# Patient Record
Sex: FEMALE | Race: OTHER | Employment: STUDENT | URBAN - METROPOLITAN AREA
[De-identification: names, ages, dates, MRNs, and addresses within clinical notes are randomized per-mention and may not be internally consistent; named-entity substitution may affect disease eponyms.]

---
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
## 2021-04-15 ENCOUNTER — OFFICE VISIT (OUTPATIENT)
Dept: FAMILY MEDICINE CLINIC | Age: 21
End: 2021-04-15
Payer: COMMERCIAL

## 2021-04-15 VITALS
SYSTOLIC BLOOD PRESSURE: 128 MMHG | WEIGHT: 144 LBS | HEART RATE: 93 BPM | TEMPERATURE: 98 F | HEIGHT: 64 IN | BODY MASS INDEX: 24.59 KG/M2 | RESPIRATION RATE: 16 BRPM | OXYGEN SATURATION: 99 % | DIASTOLIC BLOOD PRESSURE: 88 MMHG

## 2021-04-15 DIAGNOSIS — Z86.19 HISTORY OF MONONUCLEOSIS: ICD-10-CM

## 2021-04-15 DIAGNOSIS — Z00.00 WELL ADULT EXAM: Primary | ICD-10-CM

## 2021-04-15 LAB
ALBUMIN SERPL-MCNC: 4.2 G/DL (ref 3.5–5.2)
ALP BLD-CCNC: 93 U/L (ref 35–104)
ALT SERPL-CCNC: 30 U/L (ref 0–32)
ANION GAP SERPL CALCULATED.3IONS-SCNC: 12 MMOL/L (ref 7–16)
AST SERPL-CCNC: 23 U/L (ref 0–31)
BASOPHILS ABSOLUTE: 0.03 E9/L (ref 0–0.2)
BASOPHILS RELATIVE PERCENT: 0.5 % (ref 0–2)
BILIRUB SERPL-MCNC: 0.7 MG/DL (ref 0–1.2)
BUN BLDV-MCNC: 8 MG/DL (ref 6–20)
CALCIUM SERPL-MCNC: 9.4 MG/DL (ref 8.6–10.2)
CHLORIDE BLD-SCNC: 105 MMOL/L (ref 98–107)
CO2: 23 MMOL/L (ref 22–29)
CREAT SERPL-MCNC: 0.9 MG/DL (ref 0.5–1)
EOSINOPHILS ABSOLUTE: 0.22 E9/L (ref 0.05–0.5)
EOSINOPHILS RELATIVE PERCENT: 3.8 % (ref 0–6)
GFR AFRICAN AMERICAN: >60
GFR NON-AFRICAN AMERICAN: >60 ML/MIN/1.73
GLUCOSE BLD-MCNC: 86 MG/DL (ref 74–99)
HCT VFR BLD CALC: 41.6 % (ref 34–48)
HEMOGLOBIN: 13.1 G/DL (ref 11.5–15.5)
IMMATURE GRANULOCYTES #: 0.01 E9/L
IMMATURE GRANULOCYTES %: 0.2 % (ref 0–5)
LYMPHOCYTES ABSOLUTE: 2.7 E9/L (ref 1.5–4)
LYMPHOCYTES RELATIVE PERCENT: 47 % (ref 20–42)
MCH RBC QN AUTO: 30.3 PG (ref 26–35)
MCHC RBC AUTO-ENTMCNC: 31.5 % (ref 32–34.5)
MCV RBC AUTO: 96.1 FL (ref 80–99.9)
MONOCYTES ABSOLUTE: 0.47 E9/L (ref 0.1–0.95)
MONOCYTES RELATIVE PERCENT: 8.2 % (ref 2–12)
NEUTROPHILS ABSOLUTE: 2.32 E9/L (ref 1.8–7.3)
NEUTROPHILS RELATIVE PERCENT: 40.3 % (ref 43–80)
PDW BLD-RTO: 11.9 FL (ref 11.5–15)
PLATELET # BLD: 262 E9/L (ref 130–450)
PMV BLD AUTO: 10.5 FL (ref 7–12)
POTASSIUM SERPL-SCNC: 4.6 MMOL/L (ref 3.5–5)
RBC # BLD: 4.33 E12/L (ref 3.5–5.5)
SODIUM BLD-SCNC: 140 MMOL/L (ref 132–146)
TOTAL PROTEIN: 7.1 G/DL (ref 6.4–8.3)
WBC # BLD: 5.8 E9/L (ref 4.5–11.5)

## 2021-04-15 PROCEDURE — 99395 PREV VISIT EST AGE 18-39: CPT | Performed by: FAMILY MEDICINE

## 2021-04-15 PROCEDURE — 36415 COLL VENOUS BLD VENIPUNCTURE: CPT | Performed by: FAMILY MEDICINE

## 2021-04-15 ASSESSMENT — ENCOUNTER SYMPTOMS
WHEEZING: 0
COLOR CHANGE: 0
SORE THROAT: 0
CONSTIPATION: 0
VOMITING: 0
ABDOMINAL PAIN: 0
NAUSEA: 0
RHINORRHEA: 0
BLOOD IN STOOL: 0
COUGH: 0
SHORTNESS OF BREATH: 0
EYE PAIN: 0
DIARRHEA: 0

## 2021-04-15 ASSESSMENT — PATIENT HEALTH QUESTIONNAIRE - PHQ9
SUM OF ALL RESPONSES TO PHQ QUESTIONS 1-9: 0
1. LITTLE INTEREST OR PLEASURE IN DOING THINGS: 0

## 2021-04-15 NOTE — PATIENT INSTRUCTIONS
[facility] ([facility]) -- [place] [address] [place] [address]      [phone]

## 2021-04-15 NOTE — PROGRESS NOTES
Subjective:    Abel Mix is here for a well visit. She has had both EB and CMV infection and is now doing better. ROS:  Otherwise negative    Patient Active Problem List   Diagnosis    ABC (aneurysmal bone cyst)    Spondylolysis, lumbar region    Elevated liver enzymes    EBV infection    Cytomegaloviral mononucleosis without complication    Low folate       Past medical, surgical, family and social history were reviewed, non-contributory, and unchanged unless otherwise stated. Objective:    /88   Pulse 93   Temp 98 °F (36.7 °C) (Temporal)   Resp 16   Ht 5' 4\" (1.626 m)   Wt 144 lb (65.3 kg)   SpO2 99%   BMI 24.72 kg/m²     Exam is as noted by resident with the following changes, additions or corrections:    General:  NAD; alert & oriented x 3   HEENT: Normocephalic without masses, TM's clear, OP is WNL, neck supple without masses, no cervical adenopathy, no bruits. Heart:  RRR, no murmurs, gallops, or rubs. Lungs:  CTA bilaterally, no wheeze, rales or rhonchi  Abd: bowel sounds present, nontender, nondistended, no masses  Extrem:  No clubbing, cyanosis, or edema  Neuro:  Oriented x3, no gross motor or sensory deficit noted. Assessment/Plan:          Abel Mix was seen today for annual exam.    Diagnoses and all orders for this visit:    Well adult exam    History of mononucleosis  -     COMPREHENSIVE METABOLIC PANEL; Future  -     CBC WITH AUTO DIFFERENTIAL; Future              Attending Physician Statement    I have reviewed the chart, including any radiology or labs, and have seen the patient with the resident(s). I personally reviewed and performed key elements of the history and exam.  I agree with the assessment, plan and orders as documented by the resident. Please refer to the resident note for additional information.

## 2021-04-15 NOTE — PROGRESS NOTES
2001, 2001, 07/02/2002, 04/04/2005    DTaP (Infanrix) 2001, 2001, 2001, 07/02/2002, 04/04/2005    HIB PRP-T (ActHIB, Hiberix) 2001, 2001, 04/02/2002    HPV Quadrivalent (Gardasil) 12/08/2015, 02/09/2016, 06/14/2016    Hep B/Hib (Comvax) 2001, 2001, 04/02/2002    Hepatitis A Ped/Adol (Havrix, Vaqta) 05/14/2007, 12/16/2008    Hepatitis B Ped/Adol (Engerix-B, Recombivax HB) 2001, 2001, 04/02/2002    Influenza Nasal 09/29/2008, 12/16/2008, 11/15/2011    Influenza Virus Vaccine 10/16/2012, 11/25/2013, 12/02/2014, 12/08/2015, 12/18/2017    MMR 04/02/2002, 04/04/2005    Meningococcal MCV4P (Menactra) 11/19/2012, 12/18/2017    Pneumococcal Conjugate 7-valent (Pedro Delude) 2001, 2001, 2001, 10/01/2002    Polio IPV (IPOL) 2001, 2001, 07/02/2002, 04/04/2005    Tdap (Boostrix, Adacel) 11/19/2012    Varicella (Varivax) 04/02/2002, 05/14/2007       Review of Systems   Constitutional: Negative for appetite change, chills, fever and unexpected weight change. HENT: Negative for congestion, ear pain, hearing loss, rhinorrhea, sore throat and tinnitus. Eyes: Negative for pain and visual disturbance. Respiratory: Negative for cough, shortness of breath and wheezing. Cardiovascular: Negative for chest pain and palpitations. Gastrointestinal: Negative for abdominal pain, blood in stool, constipation, diarrhea, nausea and vomiting. Genitourinary: Negative for dysuria, frequency, hematuria and urgency. Skin: Negative for color change and rash. Allergic/Immunologic: Positive for environmental allergies. Negative for food allergies. Neurological: Negative for dizziness, syncope and light-headedness. Hematological: Negative for adenopathy. Does not bruise/bleed easily. Psychiatric/Behavioral: Negative for dysphoric mood. The patient is not nervous/anxious.         BP Readings from Last 3 Encounters:   04/15/21 128/88   12/14/20 117/74   09/14/20 129/74       VS:  /88   Pulse 93   Temp 98 °F (36.7 °C) (Temporal)   Resp 16   Ht 5' 4\" (1.626 m)   Wt 144 lb (65.3 kg)   SpO2 99%   BMI 24.72 kg/m²     Physical Exam  Vitals signs reviewed. Constitutional:       General: She is awake. She is not in acute distress. Appearance: She is well-developed and well-groomed. She is not ill-appearing, toxic-appearing or diaphoretic. HENT:      Head: Normocephalic and atraumatic. Right Ear: Ear canal normal. Tympanic membrane is not erythematous or bulging. Left Ear: Ear canal normal. Tympanic membrane is not erythematous or bulging. Nose:      Right Nostril: No occlusion. Left Nostril: No occlusion. Right Turbinates: Swollen. Left Turbinates: Swollen. Mouth/Throat:      Lips: Pink. Mouth: Mucous membranes are moist.      Pharynx: Uvula midline. No oropharyngeal exudate or posterior oropharyngeal erythema. Neck:      Musculoskeletal: Neck supple. Cardiovascular:      Rate and Rhythm: Normal rate and regular rhythm. Heart sounds: S1 normal and S2 normal.   Pulmonary:      Breath sounds: No decreased breath sounds, wheezing, rhonchi or rales. Abdominal:      General: Abdomen is flat. Bowel sounds are normal.      Palpations: Abdomen is soft. There is no hepatomegaly. Tenderness: There is no abdominal tenderness. There is no guarding or rebound. Musculoskeletal:      Right lower leg: She exhibits no deformity. No edema. Left lower leg: She exhibits no deformity. No edema. Lymphadenopathy:      Cervical: No cervical adenopathy. Neurological:      General: No focal deficit present. Mental Status: She is alert. Psychiatric:         Attention and Perception: Attention normal.         Mood and Affect: Mood normal.         Speech: Speech normal.         Behavior: Behavior is cooperative. Thought Content:  Thought content normal.         Cognition and Memory: Cognition normal.         Judgment: Judgment normal.       Assessment/Plan:  1. Well adult exam  Follow-up in 1 year     2. History of mononucleosis  Recheck labs for normalization as patient had elevated liver enzymes when she had EBV and CMV last summer.   - COMPREHENSIVE METABOLIC PANEL; Future  - CBC WITH AUTO DIFFERENTIAL;  Future      Return in about 1 year (around 4/15/2022) for well-adult exam .    Chapincito Anyaa DO, PGY-3

## 2021-04-19 ENCOUNTER — OFFICE VISIT (OUTPATIENT)
Dept: FAMILY MEDICINE CLINIC | Age: 21
End: 2021-04-19

## 2021-04-19 VITALS
TEMPERATURE: 97.8 F | SYSTOLIC BLOOD PRESSURE: 118 MMHG | HEIGHT: 64 IN | HEART RATE: 72 BPM | DIASTOLIC BLOOD PRESSURE: 74 MMHG | RESPIRATION RATE: 18 BRPM | OXYGEN SATURATION: 98 % | BODY MASS INDEX: 24.41 KG/M2 | WEIGHT: 143 LBS

## 2021-04-19 DIAGNOSIS — I49.8 BIGEMINAL RHYTHM: ICD-10-CM

## 2021-04-19 DIAGNOSIS — R07.89 LEFT-SIDED CHEST WALL PAIN: Primary | ICD-10-CM

## 2021-04-19 PROCEDURE — 99214 OFFICE O/P EST MOD 30 MIN: CPT | Performed by: PHYSICIAN ASSISTANT

## 2021-04-19 PROCEDURE — 93000 ELECTROCARDIOGRAM COMPLETE: CPT | Performed by: PHYSICIAN ASSISTANT

## 2021-04-19 PROCEDURE — 3006F CXR DOC REV: CPT | Performed by: PHYSICIAN ASSISTANT

## 2021-04-19 NOTE — PROGRESS NOTES
Current Outpatient Medications:     norethindrone-ethinyl estradiol-Fe (LO LOESTRIN FE) 1 MG-10 MCG / 10 MCG tablet, Take 1 tablet by mouth daily, Disp: 84 tablet, Rfl: 3    cetirizine (ZYRTEC) 10 MG tablet, Take 1 tablet by mouth daily, Disp: 30 tablet, Rfl: 1    Allergies:   No Known Allergies    Social History:     Social History     Tobacco Use    Smoking status: Never Smoker    Smokeless tobacco: Never Used   Substance Use Topics    Alcohol use: Yes     Frequency: Monthly or less    Drug use: Never       Patient lives at home. Physical Exam:     Vitals:    04/19/21 1650   BP: 118/74   Pulse: 72   Resp: 18   Temp: 97.8 °F (36.6 °C)   SpO2: 98%   Weight: 143 lb (64.9 kg)   Height: 5' 4\" (1.626 m)       Exam:  Physical Exam  Nurses note and vital signs reviewed and patient is not hypoxic. General: The patient appears well and in no apparent distress. Patient is resting comfortably on cart. Skin: Warm, dry, no pallor noted. There is no rash noted. Head: Normocephalic, atraumatic. Eye: Normal conjunctiva  Ears, Nose, Mouth, and Throat: Oral mucosa is moist  Cardiovascular: Regular Rate and Rhythm  Respiratory: Patient is in no distress, no accessory muscle use, lungs are clear to auscultation, no wheezing, crackles or rhonchi. The patient has reproducible left-sided chest wall pain. The patient has no step-offs or crepitus noted. The patient does have symmetrical chest wall expansion. No evidence of flail chest.  No rash, ecchymosis. The patient had reproducible chest wall tenderness on the left lateral and mid axillary line area  Back: Non-tender, no CVA tenderness bilaterally to percussion. GI: Normal bowel sounds, no tenderness to palpation, no masses appreciated. No rebound, guarding, or rigidity noted.   Musculoskeletal: The patient has no evidence of calf tenderness, no pitting edema, symmetrical pulses noted bilaterally  Neurological: A&O x4, normal speech      Testing:     EKG: Frequent PVCs, ventricular bigeminy, rate of 75, nonspecific ST-T wave changes    Xr Chest Standard (2 Vw)    Result Date: 4/19/2021  EXAMINATION: TWO XRAY VIEWS OF THE CHEST 4/19/2021 4:25 pm COMPARISON: None. HISTORY: ORDERING SYSTEM PROVIDED HISTORY: Left-sided chest wall pain TECHNOLOGIST PROVIDED HISTORY: Reason for exam:->left sided chest pain FINDINGS: The lungs are without acute focal process. There is no effusion or pneumothorax. The cardiomediastinal silhouette is without acute process. The osseous structures are without acute process. No acute process. Medical Decision Making:     Vital signs reviewed    Past medical history reviewed. Allergies reviewed. Medications reviewed. Patient on arrival does not appear to be in any apparent distress or discomfort. The patient does not recall any specific traumatic injury. The patient is having some reproducible pain to the left rib area. The patient will have EKG and a chest x-ray performed. The patient is not short of breath. The patient is not hypoxic or tachycardic. The patient is not tachypneic. I personally reviewed the x-ray images and did not see any evidence of acute cardiopulmonary process. EKG showed a ventricular bigeminy pattern, rate of 75. Old EKG was reviewed and showed normal sinus rhythm without evidence of PVCs. The patient does have a documented history of PVCs but I do not see any history of bigeminy on her record. The patient did follow-up with a cardiologist through [facility] in the past.  The patient does follow with Dr. Lorene Matos. The patient will have follow-up appointment with PCP. Will try to arrange this for her. I spoke with [facility] primary care on 4/20/2021 at 8:20 AM and they are working on an appointment for the patient. The patient was comfortable using Motrin and Tylenol for home. The patient may use ice to the affected area. Continue to monitor signs and symptoms.   We will have the patient follow-up with PCP      Clinical Impression:   Abel Mix was seen today for shoulder pain. Diagnoses and all orders for this visit:    Left-sided chest wall pain  -     XR CHEST STANDARD (2 VW); Future  -     EKG 12 lead; Future  -     EKG 12 lead    Bigeminal rhythm  -     BASIC METABOLIC PANEL; Future  -     TSH; Future  -     MAGNESIUM; Future        The patient is to call for any concerns or return if any of the signs or symptoms worsen. The patient is to follow-up with PCP in the next 2-3 days for repeat evaluation repeat assessment or go directly to the emergency department.      SIGNATURE: Daniela Cisneros III, PA-C Discharged

## 2021-04-20 DIAGNOSIS — I49.8 BIGEMINAL RHYTHM: ICD-10-CM

## 2021-04-20 LAB
ANION GAP SERPL CALCULATED.3IONS-SCNC: 14 MMOL/L (ref 7–16)
BUN BLDV-MCNC: 6 MG/DL (ref 6–20)
CALCIUM SERPL-MCNC: 9.7 MG/DL (ref 8.6–10.2)
CHLORIDE BLD-SCNC: 108 MMOL/L (ref 98–107)
CO2: 22 MMOL/L (ref 22–29)
CREAT SERPL-MCNC: 1 MG/DL (ref 0.5–1)
GFR AFRICAN AMERICAN: >60
GFR NON-AFRICAN AMERICAN: >60 ML/MIN/1.73
GLUCOSE BLD-MCNC: 94 MG/DL (ref 74–99)
MAGNESIUM: 2.3 MG/DL (ref 1.6–2.6)
POTASSIUM SERPL-SCNC: 4.1 MMOL/L (ref 3.5–5)
SODIUM BLD-SCNC: 144 MMOL/L (ref 132–146)
TSH SERPL DL<=0.05 MIU/L-ACNC: 2.65 UIU/ML (ref 0.27–4.2)

## 2021-04-22 ENCOUNTER — OFFICE VISIT (OUTPATIENT)
Dept: FAMILY MEDICINE CLINIC | Age: 21
End: 2021-04-22

## 2021-04-22 ENCOUNTER — TELEPHONE (OUTPATIENT)
Dept: ADMINISTRATIVE | Age: 21
End: 2021-04-22

## 2021-04-22 VITALS
WEIGHT: 143.8 LBS | SYSTOLIC BLOOD PRESSURE: 113 MMHG | HEIGHT: 64 IN | DIASTOLIC BLOOD PRESSURE: 79 MMHG | HEART RATE: 86 BPM | RESPIRATION RATE: 18 BRPM | TEMPERATURE: 98 F | BODY MASS INDEX: 24.55 KG/M2 | OXYGEN SATURATION: 98 %

## 2021-04-22 DIAGNOSIS — R07.89 LEFT-SIDED CHEST WALL PAIN: ICD-10-CM

## 2021-04-22 DIAGNOSIS — I49.8 VENTRICULAR BIGEMINY: ICD-10-CM

## 2021-04-22 DIAGNOSIS — I49.8 VENTRICULAR BIGEMINY: Primary | ICD-10-CM

## 2021-04-22 LAB — MAGNESIUM: 2.2 MG/DL (ref 1.6–2.6)

## 2021-04-22 PROCEDURE — 93000 ELECTROCARDIOGRAM COMPLETE: CPT | Performed by: STUDENT IN AN ORGANIZED HEALTH CARE EDUCATION/TRAINING PROGRAM

## 2021-04-22 PROCEDURE — 99213 OFFICE O/P EST LOW 20 MIN: CPT | Performed by: STUDENT IN AN ORGANIZED HEALTH CARE EDUCATION/TRAINING PROGRAM

## 2021-04-22 NOTE — TELEPHONE ENCOUNTER
Pt calling to schedule NP apt with Cardiology - referral in the workque. Patient Appointment Form:      PCP: Dr. Zaid Montgomery  Referring: Dr. Ulrich     Has the Patient:    Seen a Cardiologist? No    Had a heart catheterization? No    Had heart surgery? No    Had a stress test or nuclear stress test? No    Had an echocardiogram? Scheduled for: 5/3/21. Had a vascular ultrasound? No    Had a 24/48 heart monitor or extended cardiac event monitor? No    Had recent blood work in the last 6 months? Yes 4/20/21 - Epic     Had a pacemaker/ICD/ILR implant? No    Seen an Electrophysiologist? No        Will send records via: No prior cardiology hx or recs. Date & time of appointment:  5/7/21 @ 8:00 with Dr. Ana Maria Ulloa.

## 2021-04-22 NOTE — PROGRESS NOTES
Subjective:    Raj Aguirre is here to discuss a recent visit to urgent care for left sided flank pain and an EKG showing bigeminy. ROS: Otherwise negative    Patient Active Problem List   Diagnosis    ABC (aneurysmal bone cyst)    Spondylolysis, lumbar region    Elevated liver enzymes    EBV infection    Cytomegaloviral mononucleosis without complication    Low folate       Past medical, surgical, family and social history were reviewed, non-contributory, and unchanged unless otherwise stated. Objective:    /79 (Site: Left Upper Arm, Position: Sitting, Cuff Size: Medium Adult)   Pulse 86   Temp 98 °F (36.7 °C) (Temporal)   Resp 18   Ht 5' 4\" (1.626 m)   Wt 143 lb 12.8 oz (65.2 kg)   LMP 04/01/2021   SpO2 98%   BMI 24.68 kg/m²     Exam is as noted by resident with the following changes, additions or corrections:      Assessment/Plan:        Raj Aguirre was seen today for chest pain. Diagnoses and all orders for this visit:    Ventricular bigeminy  -     MAGNESIUM; Future  -     Echocardiogram complete; Future  -     Denise Lora MD, Cardiology, [facility]    Left-sided chest wall pain  -     EKG 12 Lead  -     Echocardiogram complete; Future           Attending Physician Statement    I have reviewed the chart, including any radiology or labs. I have discussed the case, including pertinent history and exam findings with the resident. I agree with the assessment, plan and orders as documented by the resident. Please refer to the resident note for additional information.       Electronically signed by Caitlin Wolf DO on 4/25/2021 at 12:05 PM
effort is taken to assure the accuracy of this document, it is possible that grammatical, syntax,  or spelling errors may occur.

## 2021-05-03 ENCOUNTER — HOSPITAL ENCOUNTER (OUTPATIENT)
Dept: NON INVASIVE DIAGNOSTICS | Age: 21
Discharge: HOME | End: 2021-05-03
Payer: COMMERCIAL

## 2021-05-03 DIAGNOSIS — I49.8 VENTRICULAR BIGEMINY: ICD-10-CM

## 2021-05-03 DIAGNOSIS — R07.89 LEFT-SIDED CHEST WALL PAIN: ICD-10-CM

## 2021-05-03 LAB
LV EF: 58 %
LVEF MODALITY: NORMAL

## 2021-05-03 PROCEDURE — 93306 TTE W/DOPPLER COMPLETE: CPT

## 2021-05-07 ENCOUNTER — OFFICE VISIT (OUTPATIENT)
Dept: CARDIOLOGY CLINIC | Age: 21
End: 2021-05-07

## 2021-05-07 VITALS
WEIGHT: 145.2 LBS | DIASTOLIC BLOOD PRESSURE: 60 MMHG | HEIGHT: 64 IN | HEART RATE: 69 BPM | SYSTOLIC BLOOD PRESSURE: 106 MMHG | BODY MASS INDEX: 24.79 KG/M2 | RESPIRATION RATE: 18 BRPM

## 2021-05-07 DIAGNOSIS — I49.8 VENTRICULAR BIGEMINY: Primary | ICD-10-CM

## 2021-05-07 PROCEDURE — 99244 OFF/OP CNSLTJ NEW/EST MOD 40: CPT | Performed by: INTERNAL MEDICINE

## 2021-05-07 PROCEDURE — 93000 ELECTROCARDIOGRAM COMPLETE: CPT | Performed by: INTERNAL MEDICINE

## 2021-05-07 NOTE — PROGRESS NOTES
OUTPATIENT CARDIOLOGY CONSULT    Name: Rahel Myers    Age: 22 y.o. Date of Service: 5/7/2021    Reason for Consultation: PVCs, bigeminy    Referring Physician: Yanet Guerrero DO    History of Present Illness:  Rahel Myers is a 22 y.o. female who presents today for further evaluation of PVCs. She is accompanied by her father. She is healthy with no prior cardiac history but does have known frequent PVCs for which she has been evaluated in the past at [facility]. She is currently a college student at [employer]. She is entirely asymptomatic. She was seen in urgent care for some flank pain was found to have bigeminy. Dr. Carl Ford ordered an echo which was just done on 5/3/2021 which was personally reviewed, and demonstrated a structurally normal heart with normal biventricular function and valvular function. She had a Holter monitor in 2017 that demonstrated 21% burden of PVCs. Subsequent exercise treadmill stress test was normal, and PVCs abated with exercise. Heart was structurally normal by echo at that time as well. States she only occasionally feels palpitations when she is stressed out. Denies chest pain, shortness of breath, functional limitation, dizziness, lightheadedness, syncope. No family history of premature CAD or sudden cardiac death. Her mother has hypertension. She does not smoke. She drinks alcohol occasionally. No drug use. Does not exercise regularly but is working at [employer] and getting 10,000 steps with that. Review of Systems:  Complete review of systems otherwise negative except as described above. Past Medical History:  Past Medical History:   Diagnosis Date    Allergic rhinitis     Irregular heart beats     Menorrhagia     Mononucleosis        Past Surgical History:  History reviewed. No pertinent surgical history.     Family History:  Family History   Problem Relation Age of Onset    High Blood Pressure Mother    [facility] Diabetes type 2  Father     No Known Problems Brother     Cancer Paternal Grandmother         skin    Cancer Paternal Grandfather         skin       Social History:  Social History     Tobacco Use    Smoking status: Never Smoker    Smokeless tobacco: Never Used   Substance Use Topics    Alcohol use: Yes     Frequency: Monthly or less    Drug use: Never        Allergies:  No Known Allergies    Current Medications:    Current Outpatient Medications:     norethindrone-ethinyl estradiol-Fe (LO LOESTRIN FE) 1 MG-10 MCG / 10 MCG tablet, Take 1 tablet by mouth daily, Disp: 84 tablet, Rfl: 3    cetirizine (ZYRTEC) 10 MG tablet, Take 1 tablet by mouth daily, Disp: 30 tablet, Rfl: 1    Physical Exam:  /60   Pulse 69   Resp 18   Ht 5' 4\" (1.626 m)   Wt 145 lb 3.2 oz (65.9 kg)   BMI 24.92 kg/m²   Wt Readings from Last 3 Encounters:   05/07/21 145 lb 3.2 oz (65.9 kg)   04/22/21 143 lb 12.8 oz (65.2 kg)   04/19/21 143 lb (64.9 kg)     Appearance: Awake, alert, no acute respiratory distress  Skin: Intact, no rash  Eyes: EOMI, no conjunctival erythema  ENMT: Moist mucous membranes. Neck: Supple, no elevated JVP, no carotid bruits  Lungs: Clear to auscultation bilaterally. No wheezes, rales, or rhonchi. Cardiac: Regular rhythm with a normal rate.   S1 & S2 normal, no murmurs  Abdomen: Soft, nontender, +bowel sounds  Extremities: Moves all extremities x 4, no lower extremity edema  Neurologic: No focal motor deficits apparent, normal mood and affect  Peripheral Pulses: Intact posterior tibial pulses bilaterally    Laboratory Tests:  Lab Results   Component Value Date    CREATININE 1.0 04/20/2021    BUN 6 04/20/2021     04/20/2021    K 4.1 04/20/2021     (H) 04/20/2021    CO2 22 04/20/2021     Lab Results   Component Value Date    MG 2.2 04/22/2021     Lab Results   Component Value Date    WBC 5.8 04/15/2021    HGB 13.1 04/15/2021    HCT 41.6 04/15/2021    MCV 96.1 04/15/2021     04/15/2021 caval veins return to the right atrium. No    persistent left superior caval vein is seen, there is no coronary    sinus dilation. Pulmonary veins    - Normal pulmonary venous return. Normal phasic pulmonary vein    Doppler. A-V CANAL  Tricuspid valve    - The valve is structurally normal.    - There is no evidence for stenosis. There is trivial    regurgitation. Mitral valve    - The valve is structurally normal. No echocardiographic evidence    for prolapse. - There is no evidence for stenosis. There is no regurgitation. VENTRICLES  Right ventricle    - The cavity size is normal. Wall thickness is normal. Systolic    function is qualitatively normal.    Ventricular septum    - There is no evidence of a ventricular septal defect. Left ventricle    - The cavity size is normal. Wall thickness is normal. Systolic    function is quantitatively normal. The endocardial fractional    shortening is 32% by M-mode. CONOTRUNCUS  Aortic valve    - The valve is structurally normal. The valve is trileaflet. - There is no stenosis. There is no regurgitation. Pulmonic valve    - The valve is structurally normal.    - There is no stenosis. There is trivial regurgitation. Coronaries    - The left main has a normal origin from the left sinus of    Valsalva. The right coronary arises normally from the right sinus    of Valsalva. No aneurysms or dilation is seen. GREAT ARTERIES  Aorta    - The arch is left-sided. Normal aortic arch branching pattern. - The aorta is without evidence of coarctation. Pulmonary arteries    - The proximal branch pulmonary arteries are normal.    Main pulmonary artery: The artery is of normal size. Systemic-pulmonary shunts    - No evidence of a patent ductus arteriosus. PERICARDIUM    - There is no significant pericardial effusion.     Stress test:    Exercise treadmill stress test 2/2018 at [facility]  13 minutes Felipe protocol  No ischemic changes  Frequent monomorphic PVCs which abated with increasing exercise    Cardiac catheterization: None    Holter monitor 48 hour 2017  Sinus rhythm average rate 91 bpm  Frequent PVCs with a 21.4% burden  No other arrhythmias    Orders Placed This Encounter   Procedures    Holter monitor 24 hour    EKG 12 Lead        Requested Prescriptions      No prescriptions requested or ordered in this encounter        ASSESSMENT / PLAN:  1. PVCs with structurally normal heart  2. Bigeminy    Recommendations:  Asymptomatic from cardiac standpoint except for occasional palpitations, without any functional limitation or concerning symptoms. Echo and EKG are unremarkable, electrolytes and TSH normal.    · Check 24-hour Holter monitor to reassess PVC burden; she would like to do this in a few weeks since she is moving in to a new apartment tomorrow  · Maintain adequate hydration, moderate alcohol and caffeine use  · Encouraged regular exercise and general healthy lifestyle  · Risk factor modification  · Explained likely benign nature of PVCs though with high burden may impact heart function  · Generally treatment not needed unless significantly symptomatic or has effect on LV function  · If above unremarkable follow-up in 1 year or as needed    Above discussed with patient and her father    Thank you for allowing me to participate in your patient's care. Please feel free to contact me if you have any questions or concerns.     Telma Monsivais MD, [facility]

## 2021-05-24 ENCOUNTER — NURSE ONLY (OUTPATIENT)
Dept: CARDIOLOGY CLINIC | Age: 21
End: 2021-05-24

## 2021-05-24 NOTE — PROGRESS NOTES
Pt was seen in office for the placement of a 24 hour holter (Cardionet) per Sundar Rockwell Patient tolerated well and understood instructions.  Device # [id number]

## 2021-06-03 ENCOUNTER — TELEPHONE (OUTPATIENT)
Dept: CARDIOLOGY CLINIC | Age: 21
End: 2021-06-03

## 2021-06-03 DIAGNOSIS — I49.8 VENTRICULAR BIGEMINY: ICD-10-CM

## 2021-06-03 NOTE — TELEPHONE ENCOUNTER
Patient calling for monitor results. Preliminary report scanned into Media for review. Please advise.

## 2021-06-17 NOTE — TELEPHONE ENCOUNTER
Contacted patient with results per Dr. Mike Tatum. She verbalized understanding and will follow-up prn.

## 2022-09-28 PROBLEM — R87.810 CERVICAL HIGH RISK HUMAN PAPILLOMAVIRUS (HPV) DNA TEST POSITIVE: Status: ACTIVE | Noted: 2022-09-28

## 2022-09-28 PROBLEM — R87.612 LGSIL OF CERVIX OF UNDETERMINED SIGNIFICANCE: Status: ACTIVE | Noted: 2022-09-28

## 2023-10-26 ENCOUNTER — TELEPHONE (OUTPATIENT)
Dept: FAMILY MEDICINE CLINIC | Age: 23
End: 2023-10-26

## 2023-10-26 NOTE — TELEPHONE ENCOUNTER
Pt cut her leg on Tuesday with metal, called to see if she needed a Tetanus. Dr. Stephany Bonds verified patient will need Tetanus vaccination. I spoke with patient, [place], advised patient to check with school nurse or local pharmacy for vaccine.

## 2025-04-08 ENCOUNTER — TELEPHONE (OUTPATIENT)
Facility: HOSPITAL | Age: 25
End: 2025-04-08

## 2025-04-08 ENCOUNTER — ENROLLMENT (OUTPATIENT)
Dept: INTERNAL MEDICINE | Age: 25
End: 2025-04-08

## 2025-04-08 NOTE — TELEPHONE ENCOUNTER
Specialty Medication Service    Date: 4/8/2025  Patient's Name: Marina Padgett YOB: 2000            _____________________________________________________________________________________________    Override placed to not hold therapy since [facility] has been following (next follow up 05/14/25) but since pt is switching from having PHE16 ([facility]) to PHE03 ([facility]) she is going to need to get enrolled with [facility]. Asked for [facility] to transfer pt over if they speak with her first while we are in the process of outreaching.       Smiley Ervin CPhT  Clinical    Specialty Medication Services  Phone: [phone] option #4  Fax: [phone]    For Pharmacy Admin Tracking Only    Program: [facility]  CPA in place:  No  Recommendation Provided To: Pharmacy: 1  Intervention Detail: Benefit Assistance  Intervention Accepted By: Pharmacy: 1  Gap Closed?:    Time Spent (min): 10

## 2025-04-14 ENCOUNTER — PHARMACY VISIT (OUTPATIENT)
Dept: INTERNAL MEDICINE | Age: 25
End: 2025-04-14

## 2025-04-14 ENCOUNTER — OFFICE VISIT (OUTPATIENT)
Dept: FAMILY MEDICINE CLINIC | Age: 25
End: 2025-04-14
Payer: COMMERCIAL

## 2025-04-14 ENCOUNTER — TELEPHONE (OUTPATIENT)
Dept: INTERNAL MEDICINE | Age: 25
End: 2025-04-14

## 2025-04-14 VITALS
WEIGHT: 149 LBS | RESPIRATION RATE: 16 BRPM | TEMPERATURE: 97.2 F | OXYGEN SATURATION: 99 % | DIASTOLIC BLOOD PRESSURE: 78 MMHG | SYSTOLIC BLOOD PRESSURE: 120 MMHG | BODY MASS INDEX: 25.58 KG/M2 | HEART RATE: 80 BPM

## 2025-04-14 DIAGNOSIS — L20.9 ATOPIC DERMATITIS, UNSPECIFIED TYPE: Primary | ICD-10-CM

## 2025-04-14 DIAGNOSIS — R09.82 POSTNASAL DRIP: ICD-10-CM

## 2025-04-14 DIAGNOSIS — J02.9 SORE THROAT: ICD-10-CM

## 2025-04-14 DIAGNOSIS — R09.81 NASAL CONGESTION: ICD-10-CM

## 2025-04-14 DIAGNOSIS — H65.193 ACUTE MEE (MIDDLE EAR EFFUSION), BILATERAL: ICD-10-CM

## 2025-04-14 DIAGNOSIS — J01.90 ACUTE NON-RECURRENT SINUSITIS, UNSPECIFIED LOCATION: Primary | ICD-10-CM

## 2025-04-14 LAB
INFLUENZA A ANTIBODY: NORMAL
INFLUENZA B ANTIBODY: NORMAL
Lab: NORMAL
PERFORMING INSTRUMENT: NORMAL
QC PASS/FAIL: NORMAL
S PYO AG THROAT QL: NORMAL
SARS-COV-2, POC: NORMAL

## 2025-04-14 PROCEDURE — 99204 OFFICE O/P NEW MOD 45 MIN: CPT | Performed by: PHYSICIAN ASSISTANT

## 2025-04-14 PROCEDURE — 87880 STREP A ASSAY W/OPTIC: CPT | Performed by: PHYSICIAN ASSISTANT

## 2025-04-14 PROCEDURE — 87426 SARSCOV CORONAVIRUS AG IA: CPT | Performed by: PHYSICIAN ASSISTANT

## 2025-04-14 PROCEDURE — 87804 INFLUENZA ASSAY W/OPTIC: CPT | Performed by: PHYSICIAN ASSISTANT

## 2025-04-14 RX ORDER — PREDNISONE 10 MG/1
TABLET ORAL
Qty: 18 TABLET | Refills: 0 | Status: SHIPPED | OUTPATIENT
Start: 2025-04-14

## 2025-04-14 RX ORDER — DUPILUMAB 300 MG/2ML
300 INJECTION, SOLUTION SUBCUTANEOUS
COMMUNITY
Start: 2025-04-08

## 2025-04-14 RX ORDER — CEFDINIR 300 MG/1
300 CAPSULE ORAL 2 TIMES DAILY
Qty: 20 CAPSULE | Refills: 0 | Status: SHIPPED | OUTPATIENT
Start: 2025-04-14 | End: 2025-04-24

## 2025-04-14 ASSESSMENT — PROMIS GLOBAL HEALTH SCALE
IN GENERAL, WOULD YOU SAY YOUR QUALITY OF LIFE IS...[ON A SCALE OF 1 (POOR) TO 5 (EXCELLENT)]: EXCELLENT
IN GENERAL, PLEASE RATE HOW WELL YOU CARRY OUT YOUR USUAL SOCIAL ACTIVITIES (INCLUDES ACTIVITIES AT HOME, AT WORK, AND IN YOUR COMMUNITY, AND RESPONSIBILITIES AS A PARENT, CHILD, SPOUSE, EMPLOYEE, FRIEND, ETC) [ON A SCALE OF 1 (POOR) TO 5 (EXCELLENT)]?: EXCELLENT
IN GENERAL, WOULD YOU SAY YOUR HEALTH IS...[ON A SCALE OF 1 (POOR) TO 5 (EXCELLENT)]: EXCELLENT
IN THE PAST 7 DAYS, HOW WOULD YOU RATE YOUR PAIN ON AVERAGE [ON A SCALE FROM 0 (NO PAIN) TO 10 (WORST IMAGINABLE PAIN)]?: 2
IN GENERAL, HOW WOULD YOU RATE YOUR PHYSICAL HEALTH [ON A SCALE OF 1 (POOR) TO 5 (EXCELLENT)]?: EXCELLENT
IN THE PAST 7 DAYS, HOW OFTEN HAVE YOU BEEN BOTHERED BY EMOTIONAL PROBLEMS, SUCH AS FEELING ANXIOUS, DEPRESSED, OR IRRITABLE [ON A SCALE FROM 1 (NEVER) TO 5 (ALWAYS)]?: NEVER
SUM OF RESPONSES TO QUESTIONS 2, 4, 5, & 10: 20
TO WHAT EXTENT ARE YOU ABLE TO CARRY OUT YOUR EVERYDAY PHYSICAL ACTIVITIES SUCH AS WALKING, CLIMBING STAIRS, CARRYING GROCERIES, OR MOVING A CHAIR [ON A SCALE OF 1 (NOT AT ALL) TO 5 (COMPLETELY)]?: COMPLETELY
SUM OF RESPONSES TO QUESTIONS 3, 6, 7, & 8: 17
IN GENERAL, HOW WOULD YOU RATE YOUR SATISFACTION WITH YOUR SOCIAL ACTIVITIES AND RELATIONSHIPS [ON A SCALE OF 1 (POOR) TO 5 (EXCELLENT)]?: EXCELLENT
IN GENERAL, HOW WOULD YOU RATE YOUR MENTAL HEALTH, INCLUDING YOUR MOOD AND YOUR ABILITY TO THINK [ON A SCALE OF 1 (POOR) TO 5 (EXCELLENT)]?: EXCELLENT

## 2025-04-14 NOTE — TELEPHONE ENCOUNTER
Specialty Medication Service    Date: 4/14/2025  Patient's Name: Marina Padgett YOB: 2000            _____________________________________________________________________________________________    Spoke with patient to schedule PharmD initial appointment for Specialty Medication Services. Patient scheduled 04/14/25 at 430pm.  Called specialist office ([facility]) to request office notes.      Gwen Ortega CPhT  Pharmacy   Specialty Medication Services   Phone: [phone] option 4    For Pharmacy Admin Tracking Only    Program: [facility]  CPA in place:  No  Recommendation Provided To: Patient/Caregiver: 1 via Telephone  Intervention Detail: Scheduled Appointment  Intervention Accepted By: Patient/Caregiver: 1  Gap Closed?:    Time Spent (min): 15

## 2025-04-14 NOTE — PROGRESS NOTES
Specialty Medication Service    Patient's Name: Marina Padgett YOB: 2000      Marina Padgett is a 25 y.o. female presenting today for Specialty Medication Service visit. Patient is prescribed [facility] formulary medication, Dupixent. Medication list updated.    Specialty Medication: Dupixent 300mg/2mL SOPN   Frequency: Every 14 days  Indication: Atopic dermatitis  Initially Diagnosed: ~9 years old, started on medications ~17 years old  Additional Therapy:   Cerave  Previous Therapy:   Halobetasol    Specialist:   Christi Blount  [address] [place] [address]  [phone]  Specialist Progress Note Available: Yes, scanned in Media tab  Last Specialist Visit: 8/18/2024  Mild PIH. Status: well controlled. Total BSA: 2.0%. Overall assessment- 1.0- almost clear. Itch numeric rating scale: 4.0. Continue Dupixent.    No Known Allergies     Past Medical History:   Diagnosis Date    Allergic rhinitis     Irregular heart beats     Menorrhagia     Mononucleosis       Social History     Tobacco Use    Smoking status: Never    Smokeless tobacco: Never   Substance Use Topics    Alcohol use: Yes     Family History   Problem Relation Age of Onset    High Blood Pressure Mother     Diabetes type 2  Father     No Known Problems Brother     Cancer Paternal Grandmother         skin    Cancer Paternal Grandfather         skin     REVIEW OF CURRENT DISEASE STATE  Dermatitis/Eczema: Marina Padgett is here for continued evaluation of skin condition related to speciality medication use. Patient reports no skin rash located, 100% clear. Patient states that she will get some mild, intermittent itching that will occur mostly on her hands and from her calves to her feet. Triggers for the itching are weather changes, stress, alcohol, or can occur randomly. Patient works as a  and on busy days it can cause some stress causing the itchy symptoms; patient states this happens ~2-5 times per month. Prior to

## 2025-04-14 NOTE — PROGRESS NOTES
26  Marina Padgett : 2000 Sex: female  Age 25 y.o.      Subjective:  Chief Complaint   Patient presents with    Pharyngitis     Started Thursday    Sinus Problem    Fatigue    Ear Problem     Right ear draining         HPI:     History of Present Illness  The patient is a 25-year-old female who presents for evaluation of sneezing, sinus pressure, and sore throat.    Symptoms began on Thursday night, characterized by excessive sneezing, sinus pressure, and a sore throat. These symptoms disrupted sleep, leading to daytime sleepiness and significant weakness on Friday. By Saturday, the sinus pressure had subsided, but the sore throat persisted along with a loss of appetite. Gradual improvement has been noted, but throat discomfort continues, now accompanied by a sensation of clogged ears that started last night. She reports no history of smoking and does not endorse any symptoms of nausea, vomiting, or syncope. Symptoms have been managed with Tylenol Extra Strength.    SOCIAL HISTORY  She works as a . She does not smoke.            ROS:   Unless otherwise stated in this report the patient's positive and negative responses for review of systems for constitutional, eyes, ENT, cardiovascular, respiratory, gastrointestinal, neurological, , musculoskeletal, and integument systems and related systems to the presenting problem are either stated in the history of present illness or were not pertinent or were negative for the symptoms and/or complaints related to the presenting medical problem.  Positives and pertinent negatives as per HPI.  All others reviewed and are negative.      PMH:     Past Medical History:   Diagnosis Date    Allergic rhinitis     Irregular heart beats     Menorrhagia     Mononucleosis        History reviewed. No pertinent surgical history.    Family History   Problem Relation Age of Onset    High Blood Pressure Mother     Diabetes type 2  Father     No Known Problems

## 2025-04-14 NOTE — TELEPHONE ENCOUNTER
Specialty Medication Service    Date: 4/14/2025  Patient's Name: Marina Padgett YOB: 2000            _____________________________________________________________________________________________    Called patient's specialist office to request most recent office visit summary and relevant labs for Specialty Medication Service formulary medication.  Faxed  to have faxed to [phone]. Also sent [facility] referral form for provider to complete.    Notes received and added to media.    Gwen Ortega CPhT  Pharmacy   Specialty Medication Services   Phone: [phone] option 4    For Pharmacy Admin Tracking Only    Program: [facility]  CPA in place:  No  Recommendation Provided To: Provider: 2 via Fax sent to office  Intervention Detail:   Intervention Accepted By: Provider: 1  Gap Closed?:    Time Spent (min): 15

## 2025-04-16 NOTE — TELEPHONE ENCOUNTER
Specialty Medication Service    Date: 4/16/2025  Patient's Name: Marina Padgett YOB: 2000            _____________________________________________________________________________________________    Refaxed referral to providers office for completion.    Gwen Ortega CPhT  Pharmacy   Specialty Medication Services   Phone: [phone] option 4    For Pharmacy Admin Tracking Only    Program: [facility]  CPA in place:  Yes  Recommendation Provided To: Provider: 2 via Called provider office and Fax sent to office  Intervention Detail:   Intervention Accepted By: Provider: 1  Gap Closed?:    Time Spent (min): 15

## 2025-05-22 ENCOUNTER — PHARMACY VISIT (OUTPATIENT)
Age: 25
End: 2025-05-22

## 2025-05-22 DIAGNOSIS — L20.9 ATOPIC DERMATITIS, UNSPECIFIED TYPE: Primary | ICD-10-CM

## 2025-05-23 RX ORDER — DUPILUMAB 300 MG/2ML
300 INJECTION, SOLUTION SUBCUTANEOUS
Qty: 4 ML | Refills: 6 | Status: SHIPPED | OUTPATIENT
Start: 2025-05-23

## 2025-05-23 NOTE — PROGRESS NOTES
Initial Specialty Medication Virtual Visit  [facility]  [facility]  [address]  [facility] [address]  Dept: [phone]  Dept Fax: [phone]  Date of patient's visit: 5/23/2025  Patient's Name:  Marina Padgett YOB: 2000            Patient Care Team:  Aditi Corrigan DO as PCP - General (Family Medicine)  ================================================================    REASON FOR VISIT/CHIEF COMPLAINT:  Medication Check (SMS Dupixant)      HISTORY OF PRESENTING ILLNESS:  Marina Padgett is 25 y.o. is here for initial virtual visit for specialty medication.      Specialty Medication: Dupixent 300mg/2mL SOPN   Frequency: Every 14 days  Indication: Atopic dermatitis  Initially Diagnosed: ~9 years old, started on medications ~17 years old  Additional Therapy:   Cerave  Previous Therapy:   Halobetasol     Specialist:   Christi Blount  [address] [place] [address]  [phone]  Specialist Progress Note Available: Yes, scanned in Media tab  Last Specialist Visit: 8/18/2024    Marina Padgett has no new complain today.       DIAGNOSTIC FINDINGS:  CBC:  Lab Results   Component Value Date/Time    WBC 5.8 04/15/2021 09:36 AM    HGB 13.1 04/15/2021 09:36 AM     04/15/2021 09:36 AM       BMP:    Lab Results   Component Value Date/Time     04/20/2021 09:26 AM    K 4.1 04/20/2021 09:26 AM     04/20/2021 09:26 AM    CO2 22 04/20/2021 09:26 AM    BUN 6 04/20/2021 09:26 AM    CREATININE 1.0 04/20/2021 09:26 AM    GLUCOSE 94 04/20/2021 09:26 AM       HEMOGLOBIN A1C: No results found for: \"LABA1C\"    FASTING LIPID PANEL:No results found for: \"CHOL\", \"HDL\", \"TRIG\"    No results found for: \"SHEILA\"    Lab Results   Component Value Date    HEPAIGM Non-Reactive 06/22/2020    HEPBIGM Non-Reactive 06/22/2020           Patient Active Problem List   Diagnosis    ABC (aneurysmal bone cyst)    Spondylolysis, lumbar region    Elevated liver

## 2025-07-08 ENCOUNTER — TELEPHONE (OUTPATIENT)
Dept: FAMILY MEDICINE CLINIC | Age: 25
End: 2025-07-08

## 2025-07-08 NOTE — TELEPHONE ENCOUNTER
I spoke to patient, her gynocologist office is closed they prescribed Doxy for patient to take 1 tablet BID for 7 days.  They gave her a prescription for Diflucan, she was confused on instruction on how to take Diflucan.    I spoke to Dr. Aditi Tena, the Diflucan is take 1 tablet at sign of symptoms, in 72 hours if symptom persists take 2nd tablet.  72 hours after 2 nd tablet if symptoms are still there take 3 tablet.    Patient verbally understands